# Patient Record
Sex: FEMALE | Race: WHITE | URBAN - METROPOLITAN AREA
[De-identification: names, ages, dates, MRNs, and addresses within clinical notes are randomized per-mention and may not be internally consistent; named-entity substitution may affect disease eponyms.]

---

## 2023-10-19 ENCOUNTER — TELEPHONE (OUTPATIENT)
Dept: OBGYN CLINIC | Age: 38
End: 2023-10-19

## 2023-10-19 NOTE — TELEPHONE ENCOUNTER
Patient called today to request work-in today if possible for EOB  ultrasound appointment .  She has an  Early ob ultrasound scheduled 11- @ [facility]  Please advise patient